# Patient Record
Sex: MALE | Race: WHITE | Employment: FULL TIME | URBAN - METROPOLITAN AREA
[De-identification: names, ages, dates, MRNs, and addresses within clinical notes are randomized per-mention and may not be internally consistent; named-entity substitution may affect disease eponyms.]

---

## 2021-08-20 ENCOUNTER — OFFICE VISIT (OUTPATIENT)
Dept: ORTHOPEDIC SURGERY | Age: 46
End: 2021-08-20
Payer: OTHER GOVERNMENT

## 2021-08-20 VITALS — BODY MASS INDEX: 30.92 KG/M2 | WEIGHT: 216 LBS | RESPIRATION RATE: 12 BRPM | HEIGHT: 70 IN

## 2021-08-20 DIAGNOSIS — M25.552 LEFT HIP PAIN: Primary | ICD-10-CM

## 2021-08-20 PROCEDURE — 99203 OFFICE O/P NEW LOW 30 MIN: CPT | Performed by: ORTHOPAEDIC SURGERY

## 2021-08-20 RX ORDER — ACETAMINOPHEN 325 MG/1
325 TABLET ORAL
COMMUNITY
Start: 2021-08-03 | End: 2022-07-03

## 2021-08-20 RX ORDER — DOXYCYCLINE HYCLATE 100 MG/1
CAPSULE ORAL
COMMUNITY
Start: 2021-08-18

## 2021-08-20 RX ORDER — BENZONATATE 100 MG/1
CAPSULE ORAL
COMMUNITY
Start: 2021-08-18

## 2021-08-20 NOTE — PROGRESS NOTES
nourished. HENT: Negative otherwise noted  Head: Normocephalic and Atraumatic  Nose: Normal  Eyes: Conjunctivae and EOM are normal  Neck: Normal range of motion Neck supple. Respiratory/Cardio: Effort normal. No respiratory distress. Musculoskeletal: Physical examination notes the patient is motion up to 90 degrees not too bad he has exquisite tenderness and pain however on FADIR and KATHY ER. He has a negative Stinchfield's he has a negative logroll test no other contributory findings  Neurological: Patient is alert and oriented to person, place, and time. Normal strenght. No sensory deficit. Skin: Skin is warm and dry  Psychiatric: Behavior is normal. Thought content normal.  Nursing note and vitals reviewed. Labs and Imaging:   Patient had x-rays and MRI pushed to our system. X-rays reveal AP lateral views of the patient's left hip which are completely unremarkable for any acute or chronic process. No evidence of avascular porosis    MR arthrogram of the left hip also available for review. I not the best hip arthrogram reader but there does appear to be some disruption of the labrum anteriorly. The patient does report the medical report that he has at home that he was told he had an acetabular tear labral tear     No orders of the defined types were placed in this encounter. Assessment and Plan:  1. Left hip pain    2. Left acetabular labral tear        This is a 51 y.o. male who presents to the clinic today for evaluation / follow up of left acetabular labral tear.      Past History:    Current Outpatient Medications:     acetaminophen (MAPAP) 325 MG tablet, Take 325 mg by mouth, Disp: , Rfl:     diclofenac sodium (VOLTAREN) 1 % GEL, Apply 1 % topically, Disp: , Rfl:     benzonatate (TESSALON) 100 MG capsule, TAKE 1 CAPSULE BY MOUTH THREE TIMES A DAY AS NEEDED FOR COUGH, Disp: , Rfl:     doxycycline hyclate (VIBRAMYCIN) 100 MG capsule, TAKE 1 CAPSULE BY MOUTH TWO TIMES A DAY, Disp: , Rfl:   Allergies   Allergen Reactions    Bee Venom Anaphylaxis    Penicillin V Other (See Comments)     Social History     Socioeconomic History    Marital status:      Spouse name: Not on file    Number of children: Not on file    Years of education: Not on file    Highest education level: Not on file   Occupational History    Not on file   Tobacco Use    Smoking status: Not on file   Substance and Sexual Activity    Alcohol use: Not on file    Drug use: Not on file    Sexual activity: Not on file   Other Topics Concern    Not on file   Social History Narrative    Not on file     Social Determinants of Health     Financial Resource Strain:     Difficulty of Paying Living Expenses:    Food Insecurity:     Worried About Running Out of Food in the Last Year:     [address] in the Last Year:    Transportation Needs:     Lack of Transportation (Medical):  Lack of Transportation (Non-Medical):    Physical Activity:     Days of Exercise per Week:     Minutes of Exercise per Session:    Stress:     Feeling of Stress :    Social Connections:     Frequency of Communication with Friends and Family:     Frequency of Social Gatherings with Friends and Family:     Attends Jewish Services:     Active Member of Clubs or Organizations:     Attends Club or Organization Meetings:     Marital Status:    Intimate Partner Violence:     Fear of Current or Ex-Partner:     Emotionally Abused:     Physically Abused:     Sexually Abused:      No past medical history on file. No past surgical history on file. No family history on file. Plan  Patient was advised that he would be best served by seeing a orthopedic surgeon who performs hip arthroscopy. I did give him the name and address of Dr. Katty Wallace. Back here as needed    Provider Attestation:  I, Channing Noriega, personally performed the services described in this documentation.  All medical record entries made by the scribe were at my direction and in my presence. I have reviewed the chart and discharge instructions and agree that the records reflect my personal performance and is accurate and complete. Brea Shields MD. 08/20/21      Please note that this chart was generated using voice recognition Dragon dictation software. Although every effort was made to ensure the accuracy of this automated transcription, some errors in transcription may have occurred.

## 2022-04-22 ENCOUNTER — HOSPITAL ENCOUNTER (OUTPATIENT)
Dept: PHYSICAL THERAPY | Facility: CLINIC | Age: 47
Setting detail: THERAPIES SERIES
Discharge: HOME | End: 2022-04-22
Payer: OTHER GOVERNMENT

## 2022-04-22 PROCEDURE — 95992 CANALITH REPOSITIONING PROC: CPT

## 2022-04-22 PROCEDURE — 97161 PT EVAL LOW COMPLEX 20 MIN: CPT

## 2022-04-22 PROCEDURE — 97530 THERAPEUTIC ACTIVITIES: CPT

## 2022-04-22 NOTE — PROGRESS NOTES
BED REST DUE TO BEING NWB . BEGAN ABOUT 4 WEEKS. CC:  PATIENT REPORTS BOUTS OF A SPINNING SENSATION WITH ASSOCIATED NAUSEA AND VOMITING LASTING HOURS. HE STATES A BOUT \"HITS EVERY 3-4 DAYS\". HE HAS A HISTORY OF TINNITUS AND NOTES WHEN HE IS EXPERIENCING VERTIGO HIS TINNITUS IS MORE INTENSE.  SYMPTOMS ARE BROUGHT ON BY QUICK HEAD MOVEMENTS TO THE RIGHT. ONE BOUT OCCURRED AFTER PERFORMING PUSHUPS, ONE BOUT AFTER BENDING TO CLEAN THE FLOOR. WHEN HE IS EXPERIENCING ABOUT HE REQUIRES ASSISTANCE TO WALK DUE TO SEVERE IMBALANCE. WHEN EXPERIENCING VERTIGO, PATIENT NOTES OBJECTS APPEAR TO BE MOVING SIDE TO SIDE. HE DENIES THAT HIS SYMPTOMS ARE BROUGHT ON BY GETTING IN OR OUT OF THE BED, ROLLING IN THE BED OR LOOKING UP. PMHx:    [] Unremarkable [] Diabetes  []MI/Heart Problems   [] Pacemaker  [] HTN   [] Cancer   [] Arthritis:   [x] Surgeries:L HIP LABRUM REPAIR   [x] Other: HISTORY OF MIGRAINE HEADACHES, MOTION SENSITIVITY AND CONCUSSION    Allergies: [] NKA  [x] Refer to intake sheet  Medication: [] Refer to intake sheet  [] None  DAILY ANTIVERT  Test: [] X-Ray  [x] MRI [] CT Scan   [] Other    Pain:  LEFT HIP DID NOT RATE  /10  [] No c/o pain    Pain altered Tx: [] No  [x] Yes Action:  Symptoms: [] Improving  [] Worsening  [x] Same  Sleep: [x] OK  [] Disturbed    Fall History:  DENIES FALLS  Associated Ear Symptoms: (+) TINNITUS INCREASED DURING BOUT OF VERTIGO, HEARING IS NORMAL TO CONVERSATION,     Function:    Hand Dominance [x] Right  [] Left   Patient lives with: WIFE, Lan. [address]   In what type of home []  One story   [] Two story   [] Split level   Employer    Job Status []  Normal duty   [] Light duty   [x] Off due to condition  (HIP)[]  Retired   [] Not employed   [] Disability  [] Other:  []  Return to work: Work activities/duties PATIENT IS IN THE  BUT HIS ACTIVITY IS LIMITED DUE TO RECENT L HIP SURGERY. PATIENT IS INDEP IN SELF CARE AND ADL'S.   HE IS ABLE TO DRIVE BUT SELF LIMITS IF HE IS EXPERIENCING VERTIGO. Assistive Device:  [x] None   [] Straight Cane  [] Quad Cane   [] Walker []  Wh Riri Matter []4 Rometta Mall Riri Matter with Seat [] Other:      Subjective Measure Score Indications   Dizziness Handicap Inventory [facility]) PATIENT DID NOT COMPLETE CORRECTLY BPPV SUBSCALE:  6/20   Activities-Specific Balance Confidence (ABC) DID NOT COMPLETE        Objective:    OBSERVATION No Deficit Deficit Not Tested Comments   Posture       Forward Head [] [x] [] SLIGHT   Head/Neck Alignment [] [x] [] SLIGHTLY LATERALLY FLEXED TO THE RIGHT   Rounded Shoulders [] [x] [] SLIGHT   Kyphosis [x] [] []    Lordosis [x] [] []    Lateral Shift [] [] [x]    Scoliosis [] [] [x]    Iliac Crest [] [] [x]    Genu Valgus [x] [] []    Genu Varus [x] [] []    Genu Recurvatum [x] [] []    Other       Palpation [] [] [x]    Sensation [] [] [x] NO REPORTS OF NUMBNESS   Edema [x] [] [] L HIP   Neurological Signs [] [] [x]    Deep Tendon Reflex [] [] [x]    Strength [] [] [x] FUNCTIONAL FOR GAIT, SIGNIFICANT L LE ATROPHY       SPECIAL TESTS AND RED FLAGS:   Test Result   Vertebral Artery No symptoms reported   Alar Ligament Neg laxity   Transverse Ligament/Sharp Lamin Neg laxity     Cervical spine AROM:  Motion Percent Normal AROM Pain/Symptoms   Flexion 100% [] Pain   [] Dizziness   [] Other:   Extension 100% [] Pain   [] Dizziness   [] Other:   (R), (L) Rotation R 75%, L 100% [] Pain   [] Dizziness   [] Other:   (R), (L) Side Bending B 50%                   [] Pain   [] Dizziness   [x] Other: STIFF               VESTIBULAR OCULOMOTOR SCREENING:  TEST ROOM LIGHT WITHOUT FIXATION-IR GOGGLES   SPONTANEOUS NYSTAGMUS NEGATIVE NEGATIVE   GAZE HOLDING NYSTAGMUS (L): NEG   (L):  NEG    (R):  NEG (R): NEG    UP: NEG UP: NEG   SMOOTH PURSUIT PATIENT WAS ABLE TO MAINTAIN GAZE ON [employer].   PROVOKED \"[place] IN THE HEAD\" SYMPTOMS    SACCADES NO DYSMETRIA, SLOW, PROVOKED MILD SYMPTOMS    VOR CANCELLATION NEG    VOR TO SLOW HEAD MOVEMENT NEG    HEAD IMPULSE TEST (HEAD THRUST) (L): NOT TESTED     (R):   NT    HEAD SHAKING NYSTAGMUS  NEG   VALSALVA/  HYPERVENTILATION  NEG/NEG   RIGHT ZIYAD-HALLPIKE  NEG   LEFT ZIYAD-HALLPIKE  NEG   SUPINE HEAD CENTER  NEG   ROLL TEST (L):   (L):  NO NYSTAGMUS OBSERVED  PATIENT REPORTED NAUSEA      (R):   (R):  NEG   BOW AND LEAN TEST  BOW: NEG  LEAN: NEG   NECK TORSION TEST BODY (R):   BODY (L):  BODY (R): NA  BODY (L): NA   VISUAL ACUITY STATIC:  DYNAMIC: NOT TESTED    (>2 LINE DIFFERENCE INDICATES VESTIBULAR HYPOFUNCTION)          Assessment:  PATIENTS HAS BEEN SPEEDING INCREASED TIME IN RIGHT SIDELYING POSITION SINCE HIS HIP SURGERY. THIS COULD CAUSE BPPV DUE TO THE OFFENDING EAR BEING IN A DEPENDENT POSITION FOR PROLONGED PERIODS  . SYMPTOMS SOUND LIKE BPPV IN THAT THEY ARE BROUGHT ON BY HEAD MOVEMENT AND POSITIONS BUT HE HAS NO SYMPTOMS IN THE BED. ALSO HIS SYMPTOMS LAST HOURS INSTEAD OF LESS THAN A MINUTE AS EXPECTED WITH BPPV. A FACTOR IN THE DURATION AND SEVERITY OF HIS SYMPTOMS IS HIS VISUAL MOTION SENSITIVITY WHICH HAS INCREASED DUE TO BEING ON BED REST AND LESS ACTIVE. THIS MIGHT EXPLAIN WHY HE REPORTS SYMPTOMS LASTING HOURS. ALSO HE HAS A HISTORY OF MIGRAINE HEADACHES. PATIENT DESCRIBES HORIZONTAL NYSTAGMUS. PATIENT WOULD BENEFIT FROM CONTINUED PHYSICAL THERAPY TO ADDRESS THE PROBLEMS LISTED BELOW AND TO WORK TOWARDS REACHING THE STATED GOALS. Problems:                                                                                     [x]  Vertigo  [x]  Difficulty walking straight course                                     []  Positive Ziyad Hallpike/Roll Test for BPPV  []   Gait Dysfunction:                                            []  Static balance deficit: mCTSIB  []  Dynamic balance deficit: Gonzalez Balance Scale Jose Gallop), Functional Gait Assessment (FGA)  []  ?  Function: Dizziness Handicap Inventory [facility]):   []  Decreased Balance Confidence:   [x]  Other: ABNORMAL OCULOMOTOR EXAM WHICH IS A CENTRAL SIGN Unattended  22 [id number]   [] Manual Therapy  63946 [] Electrical Stimulation Attended  [id number]   [x] Instruction in HEP  [] Lumbar/Cervical Traction  [id number]   [] Aquatic Therapy   [id number] [] Cold/hotpack    [] Massage   20103      [] Dry Needling, 1 or 2 muscles  02149   [x] Canalith Repositioning Maneuver   24965 [] Therapeutic Exercise  94228     Frequency: 1 x/weeks for 2-3 visits      Todays Treatment:  Precautions: NONE IDENTIFIED  Exercises:INSTRUCTION IN HOME EXERCISE PROGRAM  Exercise Reps/ Time Weight/ Level Comments   BARBEQUE ROLL HOME MANEUVER FOR HC BPPV 1-2  QD IN THE MORNING HEP   WALKING WITH HEAD MOTION (NOD AND ROTATION) 20 EA  QD HEP   BALANCE WITH EYES CLOSED   QD HEP         Other:     Specific Instructions for next treatment: PATIENT WILL TRY HOME MANEUVER FOR RIGHT HORIZONTAL CANAL BPPV EACH MORNING FOR ONE WEEK. IF NO IMPROVEMENT WITH TRY HOME MANEUVER FOR LEFT HC BPPV THE FOLLOWING WEEK. HE WILL PERFORM HOME EXERCISES FOR DECREASED VISUAL MOTION SENSITIVITY. WILL CONTACT PATIENT IN 1-2 WEEKS AND CHECK PROGRESS      Today's Treatment Charges        Mins       Units   [] PT Evaluation- [x] Low; [] Moderate; [] High  55 1   [x] Canalith repositioning maneuver/technique (CRM/T) 15 1   [] Therapeutic Exercise 15min     [x] Therapeutic Activity 10 1   [] Neuromuscular Reeducation     TOTAL 90 3       Start Time: 0800          Stop Time: 1824    Electronically signed by:  Heath Triplett, PT

## 2022-05-03 ENCOUNTER — HOSPITAL ENCOUNTER (OUTPATIENT)
Dept: PHYSICAL THERAPY | Facility: CLINIC | Age: 47
Setting detail: THERAPIES SERIES
Discharge: HOME | End: 2022-05-03

## 2022-05-03 NOTE — PROGRESS NOTES
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 -DIFF WALKING, R26.81 -IMBALANCE  Onset date: 6 WEEK HISTORY Next 's appt.: PRN    PATIENT REPORTS 2 BOUTS OF SYMPTOMS SINCE HIS THERAPY VESTIBULAR EVALUATION, ONE WAS MORE SEVERE THAN USUSAL LASTING ALMOST 2 DAYS. PATIENT STATES HE DID THE MANEUVER FOR HC BPPV X1 WEEK. HE FORGOT TO TRY THE OTHER SIDE IF NO IMPROVEMENT. HE WILL REVERSE THE MANEUVER AND TRY ON THE OTHER SIDE X 1 WEEK. DISCUSSED WITH PATIENT THAT HIS SYMPTOMS ARE NOT ALL CONSISTENT WITH BPPV AND HE WOULD BENEFIT FROM REFERRAL TO ENT. PATIENT STATES HE NOTIFIED HIS  BUT HAS NOT HEARD BACK.     Electronically signed by Angela Zavala PT on 5/3/2022 at 2:16 PM